# Patient Record
Sex: MALE | Race: BLACK OR AFRICAN AMERICAN | NOT HISPANIC OR LATINO | URBAN - METROPOLITAN AREA
[De-identification: names, ages, dates, MRNs, and addresses within clinical notes are randomized per-mention and may not be internally consistent; named-entity substitution may affect disease eponyms.]

---

## 2021-11-08 ENCOUNTER — APPOINTMENT (RX ONLY)
Dept: URBAN - METROPOLITAN AREA CLINIC 41 | Facility: CLINIC | Age: 21
Setting detail: DERMATOLOGY
End: 2021-11-08

## 2021-11-08 DIAGNOSIS — L73.2 HIDRADENITIS SUPPURATIVA: ICD-10-CM | Status: INADEQUATELY CONTROLLED

## 2021-11-08 PROCEDURE — ? PRESCRIPTION

## 2021-11-08 PROCEDURE — ? ADDITIONAL NOTES

## 2021-11-08 PROCEDURE — ? COUNSELING

## 2021-11-08 PROCEDURE — 99204 OFFICE O/P NEW MOD 45 MIN: CPT

## 2021-11-08 RX ORDER — DOXYCYCLINE 100 MG/1
CAPSULE ORAL
Qty: 20 | Refills: 0 | Status: ERX | COMMUNITY
Start: 2021-11-08

## 2021-11-08 RX ORDER — MUPIROCIN 20 MG/G
OINTMENT TOPICAL
Qty: 22 | Refills: 3 | Status: ERX | COMMUNITY
Start: 2021-11-08

## 2021-11-08 RX ADMIN — MUPIROCIN: 20 OINTMENT TOPICAL at 00:00

## 2021-11-08 RX ADMIN — DOXYCYCLINE: 100 CAPSULE ORAL at 00:00

## 2021-11-08 ASSESSMENT — LOCATION ZONE DERM
LOCATION ZONE: TRUNK
LOCATION ZONE: GENITALIA

## 2021-11-08 ASSESSMENT — LOCATION DETAILED DESCRIPTION DERM
LOCATION DETAILED: LEFT BUTTOCK
LOCATION DETAILED: GENITALIA
LOCATION DETAILED: RIGHT BUTTOCK

## 2021-11-08 ASSESSMENT — LOCATION SIMPLE DESCRIPTION DERM
LOCATION SIMPLE: GENITALIA
LOCATION SIMPLE: RIGHT BUTTOCK
LOCATION SIMPLE: LEFT BUTTOCK

## 2021-11-08 NOTE — HPI: CYST
Is This A New Presentation, Or A Follow-Up?: Cyst
Additional History: This is a new patient here for cysts on the buttocks. He gets them on and off for years. He has had them drained by doctors in the past but is wondering if there is any medication he can use to prevent or treat besides having them drained.

## 2022-11-16 ENCOUNTER — APPOINTMENT (RX ONLY)
Dept: URBAN - METROPOLITAN AREA CLINIC 41 | Facility: CLINIC | Age: 22
Setting detail: DERMATOLOGY
End: 2022-11-16

## 2022-11-16 DIAGNOSIS — L0391 CELLULITIS AND ABSCESS OF UNSPECIFIED SITES: ICD-10-CM | Status: INADEQUATELY CONTROLLED

## 2022-11-16 DIAGNOSIS — L0390 CELLULITIS AND ABSCESS OF UNSPECIFIED SITES: ICD-10-CM | Status: INADEQUATELY CONTROLLED

## 2022-11-16 PROBLEM — L02.212 CUTANEOUS ABSCESS OF BACK [ANY PART, EXCEPT BUTTOCK]: Status: ACTIVE | Noted: 2022-11-16

## 2022-11-16 PROCEDURE — ? INCISION AND DRAINAGE

## 2022-11-16 PROCEDURE — ? PRESCRIPTION

## 2022-11-16 PROCEDURE — ? COUNSELING

## 2022-11-16 PROCEDURE — ? ADDITIONAL NOTES

## 2022-11-16 PROCEDURE — 10060 I&D ABSCESS SIMPLE/SINGLE: CPT

## 2022-11-16 PROCEDURE — ? PRESCRIPTION MEDICATION MANAGEMENT

## 2022-11-16 RX ORDER — DICLOXACILLIN SODIUM 500 MG/1
500 CAPSULE ORAL TID
Qty: 42 | Refills: 0 | Status: CANCELLED | COMMUNITY
Start: 2022-11-16

## 2022-11-16 RX ORDER — DICLOXACILLIN SODIUM 500 MG/1
500 CAPSULE ORAL TID
Qty: 42 | Refills: 0 | Status: ERX

## 2022-11-16 RX ADMIN — DICLOXACILLIN SODIUM 500: 500 CAPSULE ORAL at 00:00

## 2022-11-16 ASSESSMENT — LOCATION SIMPLE DESCRIPTION DERM: LOCATION SIMPLE: LOWER BACK

## 2022-11-16 ASSESSMENT — LOCATION DETAILED DESCRIPTION DERM: LOCATION DETAILED: INFERIOR LUMBAR SPINE

## 2022-11-16 ASSESSMENT — LOCATION ZONE DERM: LOCATION ZONE: TRUNK

## 2022-11-16 NOTE — HPI: CYST
How Severe Is Your Cyst?: moderate
Is This A New Presentation, Or A Follow-Up?: Cyst
Additional History: Est pt presenting with a possible cyst on back. Pt notes it’s very irritating and very painful.

## 2022-11-16 NOTE — PROCEDURE: PRESCRIPTION MEDICATION MANAGEMENT
Initiate Treatment: Dicloxacillin 500mg TID x 2 weeks
Render In Strict Bullet Format?: No
Detail Level: Zone

## 2023-09-06 ENCOUNTER — RX ONLY (OUTPATIENT)
Age: 23
Setting detail: RX ONLY
End: 2023-09-06

## 2023-09-06 ENCOUNTER — APPOINTMENT (RX ONLY)
Dept: URBAN - METROPOLITAN AREA CLINIC 34 | Facility: CLINIC | Age: 23
Setting detail: DERMATOLOGY
End: 2023-09-06

## 2023-09-06 DIAGNOSIS — L0390 CELLULITIS AND ABSCESS OF UNSPECIFIED SITES: ICD-10-CM | Status: INADEQUATELY CONTROLLED

## 2023-09-06 DIAGNOSIS — L0391 CELLULITIS AND ABSCESS OF UNSPECIFIED SITES: ICD-10-CM | Status: INADEQUATELY CONTROLLED

## 2023-09-06 PROBLEM — L02.01 CUTANEOUS ABSCESS OF FACE: Status: ACTIVE | Noted: 2023-09-06

## 2023-09-06 PROCEDURE — ? INTRALESIONAL KENALOG

## 2023-09-06 PROCEDURE — ? DIAGNOSIS COMMENT

## 2023-09-06 PROCEDURE — ? PHOTO-DOCUMENTATION

## 2023-09-06 PROCEDURE — 11900 INJECT SKIN LESIONS </W 7: CPT

## 2023-09-06 PROCEDURE — ? COUNSELING

## 2023-09-06 PROCEDURE — ? PRESCRIPTION

## 2023-09-06 RX ORDER — SULFAMETHOXAZOLE AND TRIMETHOPRIM 800; 160 MG/1; MG/1
TABLET ORAL BID
Qty: 20 | Refills: 0 | Status: ERX

## 2023-09-06 RX ORDER — MUPIROCIN 20 MG/G
OINTMENT TOPICAL
Qty: 22 | Refills: 2 | Status: ERX | COMMUNITY
Start: 2023-09-06

## 2023-09-06 RX ORDER — MUPIROCIN 20 MG/G
OINTMENT TOPICAL
Qty: 22 | Refills: 2 | Status: ERX

## 2023-09-06 RX ORDER — SULFAMETHOXAZOLE AND TRIMETHOPRIM 800; 160 MG/1; MG/1
TABLET ORAL BID
Qty: 20 | Refills: 0 | Status: ERX | COMMUNITY
Start: 2023-09-06

## 2023-09-06 RX ADMIN — MUPIROCIN: 20 OINTMENT TOPICAL at 00:00

## 2023-09-06 RX ADMIN — SULFAMETHOXAZOLE AND TRIMETHOPRIM: 800; 160 TABLET ORAL at 00:00

## 2023-09-06 ASSESSMENT — LOCATION ZONE DERM: LOCATION ZONE: FACE

## 2023-09-06 ASSESSMENT — LOCATION SIMPLE DESCRIPTION DERM: LOCATION SIMPLE: LEFT CHEEK

## 2023-09-06 ASSESSMENT — LOCATION DETAILED DESCRIPTION DERM: LOCATION DETAILED: LEFT LATERAL SUBMANDIBULAR CHEEK

## 2023-09-06 NOTE — HPI: SKIN LESION
What Type Of Note Output Would You Prefer (Optional)?: Bullet Format
How Severe Is Your Skin Lesion?: moderate
Has Your Skin Lesion Been Treated?: not been treated
Is This A New Presentation, Or A Follow-Up?: Growth
Additional History: Just flew in from [facility] on Saturday, he denies any illnesses, fever, nausea